# Patient Record
Sex: FEMALE | Race: WHITE | URBAN - METROPOLITAN AREA
[De-identification: names, ages, dates, MRNs, and addresses within clinical notes are randomized per-mention and may not be internally consistent; named-entity substitution may affect disease eponyms.]

---

## 2017-05-15 ENCOUNTER — HOSPITAL ENCOUNTER (OUTPATIENT)
Dept: OTHER | Age: 57
Discharge: HOME | End: 2017-05-15
Attending: FAMILY MEDICINE

## 2017-05-17 LAB
CULTURE: NORMAL
REPORT STATUS: NORMAL
REQUEST PROBLEM: NORMAL
SPECIMEN: NORMAL

## 2019-03-24 ENCOUNTER — HOSPITAL ENCOUNTER (OUTPATIENT)
Age: 59
Setting detail: SPECIMEN
Discharge: HOME | End: 2019-03-24
Payer: COMMERCIAL

## 2019-03-24 PROCEDURE — 87804 INFLUENZA ASSAY W/OPTIC: CPT

## 2019-03-26 LAB
RAPID INFLUENZA  B AGN: NEGATIVE
RAPID INFLUENZA A AGN: NEGATIVE

## 2020-10-13 ENCOUNTER — HOSPITAL ENCOUNTER (EMERGENCY)
Age: 60
Discharge: ANOTHER HOSPITAL | End: 2020-10-13
Attending: EMERGENCY MEDICINE
Payer: MEDICARE

## 2020-10-13 ENCOUNTER — APPOINTMENT (OUTPATIENT)
Dept: MRI IMAGING | Age: 60
End: 2020-10-13
Payer: MEDICARE

## 2020-10-13 VITALS
WEIGHT: 162 LBS | SYSTOLIC BLOOD PRESSURE: 148 MMHG | HEIGHT: 62 IN | HEART RATE: 75 BPM | OXYGEN SATURATION: 100 % | TEMPERATURE: 98.5 F | RESPIRATION RATE: 20 BRPM | DIASTOLIC BLOOD PRESSURE: 78 MMHG | BODY MASS INDEX: 29.81 KG/M2

## 2020-10-13 LAB
ALBUMIN SERPL-MCNC: 3.6 GM/DL (ref 3.4–5)
ALP BLD-CCNC: 99 IU/L (ref 40–129)
ALT SERPL-CCNC: 15 U/L (ref 10–40)
ANION GAP SERPL CALCULATED.3IONS-SCNC: 12 MMOL/L (ref 4–16)
AST SERPL-CCNC: 16 IU/L (ref 15–37)
BACTERIA: NEGATIVE /HPF
BASOPHILS ABSOLUTE: 0.1 K/CU MM
BASOPHILS RELATIVE PERCENT: 0.5 % (ref 0–1)
BILIRUB SERPL-MCNC: 0.2 MG/DL (ref 0–1)
BILIRUBIN URINE: NEGATIVE MG/DL
BLOOD, URINE: NEGATIVE
BUN BLDV-MCNC: 23 MG/DL (ref 6–23)
CALCIUM SERPL-MCNC: 9 MG/DL (ref 8.3–10.6)
CHLORIDE BLD-SCNC: 99 MMOL/L (ref 99–110)
CLARITY: CLEAR
CO2: 24 MMOL/L (ref 21–32)
COLOR: COLORLESS
CREAT SERPL-MCNC: 0.8 MG/DL (ref 0.6–1.1)
DIFFERENTIAL TYPE: ABNORMAL
EOSINOPHILS ABSOLUTE: 0.2 K/CU MM
EOSINOPHILS RELATIVE PERCENT: 1.9 % (ref 0–3)
ERYTHROCYTE SEDIMENTATION RATE: 36 MM/HR (ref 0–30)
GFR AFRICAN AMERICAN: >60 ML/MIN/1.73M2
GFR NON-AFRICAN AMERICAN: >60 ML/MIN/1.73M2
GLUCOSE BLD-MCNC: 172 MG/DL (ref 70–99)
GLUCOSE, URINE: NEGATIVE MG/DL
HCT VFR BLD CALC: 36.1 % (ref 37–47)
HEMOGLOBIN: 11.5 GM/DL (ref 12.5–16)
HIGH SENSITIVE C-REACTIVE PROTEIN: 13.9 MG/L
IMMATURE NEUTROPHIL %: 1.8 % (ref 0–0.43)
KETONES, URINE: NEGATIVE MG/DL
LEUKOCYTE ESTERASE, URINE: NEGATIVE
LYMPHOCYTES ABSOLUTE: 2 K/CU MM
LYMPHOCYTES RELATIVE PERCENT: 15.7 % (ref 24–44)
MCH RBC QN AUTO: 29.7 PG (ref 27–31)
MCHC RBC AUTO-ENTMCNC: 31.9 % (ref 32–36)
MCV RBC AUTO: 93.3 FL (ref 78–100)
MONOCYTES ABSOLUTE: 0.9 K/CU MM
MONOCYTES RELATIVE PERCENT: 6.9 % (ref 0–4)
MUCUS: ABNORMAL HPF
NITRITE URINE, QUANTITATIVE: NEGATIVE
NUCLEATED RBC %: 0 %
PDW BLD-RTO: 12.7 % (ref 11.7–14.9)
PH, URINE: 7 (ref 5–8)
PLATELET # BLD: 333 K/CU MM (ref 140–440)
PMV BLD AUTO: 9.7 FL (ref 7.5–11.1)
POTASSIUM SERPL-SCNC: 4.1 MMOL/L (ref 3.5–5.1)
PROTEIN UA: NEGATIVE MG/DL
RBC # BLD: 3.87 M/CU MM (ref 4.2–5.4)
RBC URINE: <1 /HPF (ref 0–6)
SEGMENTED NEUTROPHILS ABSOLUTE COUNT: 9.2 K/CU MM
SEGMENTED NEUTROPHILS RELATIVE PERCENT: 73.2 % (ref 36–66)
SODIUM BLD-SCNC: 135 MMOL/L (ref 135–145)
SPECIFIC GRAVITY UA: 1.01 (ref 1–1.03)
SQUAMOUS EPITHELIAL: <1 /HPF
TOTAL IMMATURE NEUTOROPHIL: 0.22 K/CU MM
TOTAL NUCLEATED RBC: 0 K/CU MM
TOTAL PROTEIN: 6.2 GM/DL (ref 6.4–8.2)
TRICHOMONAS: ABNORMAL /HPF
UROBILINOGEN, URINE: NORMAL MG/DL (ref 0.2–1)
WBC # BLD: 12.5 K/CU MM (ref 4–10.5)
WBC UA: 1 /HPF (ref 0–5)

## 2020-10-13 PROCEDURE — 6370000000 HC RX 637 (ALT 250 FOR IP): Performed by: PHYSICIAN ASSISTANT

## 2020-10-13 PROCEDURE — 96376 TX/PRO/DX INJ SAME DRUG ADON: CPT

## 2020-10-13 PROCEDURE — 72158 MRI LUMBAR SPINE W/O & W/DYE: CPT

## 2020-10-13 PROCEDURE — 96375 TX/PRO/DX INJ NEW DRUG ADDON: CPT

## 2020-10-13 PROCEDURE — A9579 GAD-BASE MR CONTRAST NOS,1ML: HCPCS | Performed by: EMERGENCY MEDICINE

## 2020-10-13 PROCEDURE — 85652 RBC SED RATE AUTOMATED: CPT

## 2020-10-13 PROCEDURE — 85025 COMPLETE CBC W/AUTO DIFF WBC: CPT

## 2020-10-13 PROCEDURE — 86141 C-REACTIVE PROTEIN HS: CPT

## 2020-10-13 PROCEDURE — 96374 THER/PROPH/DIAG INJ IV PUSH: CPT

## 2020-10-13 PROCEDURE — 36415 COLL VENOUS BLD VENIPUNCTURE: CPT

## 2020-10-13 PROCEDURE — 80053 COMPREHEN METABOLIC PANEL: CPT

## 2020-10-13 PROCEDURE — 99285 EMERGENCY DEPT VISIT HI MDM: CPT

## 2020-10-13 PROCEDURE — 81001 URINALYSIS AUTO W/SCOPE: CPT

## 2020-10-13 PROCEDURE — 6360000002 HC RX W HCPCS: Performed by: PHYSICIAN ASSISTANT

## 2020-10-13 PROCEDURE — 6360000002 HC RX W HCPCS: Performed by: EMERGENCY MEDICINE

## 2020-10-13 PROCEDURE — 6360000004 HC RX CONTRAST MEDICATION: Performed by: EMERGENCY MEDICINE

## 2020-10-13 RX ORDER — OXYCODONE HYDROCHLORIDE 5 MG/1
5 TABLET ORAL ONCE
Status: COMPLETED | OUTPATIENT
Start: 2020-10-13 | End: 2020-10-13

## 2020-10-13 RX ORDER — HYDROMORPHONE HCL 110MG/55ML
1 PATIENT CONTROLLED ANALGESIA SYRINGE INTRAVENOUS ONCE
Status: COMPLETED | OUTPATIENT
Start: 2020-10-13 | End: 2020-10-13

## 2020-10-13 RX ORDER — FENTANYL CITRATE 50 UG/ML
100 INJECTION, SOLUTION INTRAMUSCULAR; INTRAVENOUS ONCE
Status: COMPLETED | OUTPATIENT
Start: 2020-10-13 | End: 2020-10-13

## 2020-10-13 RX ORDER — HYDROMORPHONE HCL 110MG/55ML
1 PATIENT CONTROLLED ANALGESIA SYRINGE INTRAVENOUS EVERY 4 HOURS PRN
Status: DISCONTINUED | OUTPATIENT
Start: 2020-10-13 | End: 2020-10-14 | Stop reason: HOSPADM

## 2020-10-13 RX ADMIN — FENTANYL CITRATE 100 MCG: 50 INJECTION, SOLUTION INTRAMUSCULAR; INTRAVENOUS at 16:59

## 2020-10-13 RX ADMIN — HYDROMORPHONE HYDROCHLORIDE 1 MG: 2 INJECTION, SOLUTION INTRAMUSCULAR; INTRAVENOUS; SUBCUTANEOUS at 13:26

## 2020-10-13 RX ADMIN — GADOTERIDOL 15 ML: 279.3 INJECTION, SOLUTION INTRAVENOUS at 17:06

## 2020-10-13 RX ADMIN — OXYCODONE HYDROCHLORIDE 5 MG: 5 TABLET ORAL at 12:05

## 2020-10-13 RX ADMIN — FENTANYL CITRATE 100 MCG: 50 INJECTION, SOLUTION INTRAMUSCULAR; INTRAVENOUS at 14:29

## 2020-10-13 RX ADMIN — HYDROMORPHONE HYDROCHLORIDE 1 MG: 2 INJECTION, SOLUTION INTRAMUSCULAR; INTRAVENOUS; SUBCUTANEOUS at 20:03

## 2020-10-13 ASSESSMENT — PAIN SCALES - WONG BAKER: WONGBAKER_NUMERICALRESPONSE: 4

## 2020-10-13 ASSESSMENT — PAIN DESCRIPTION - LOCATION: LOCATION: BACK;LEG

## 2020-10-13 ASSESSMENT — PAIN SCALES - GENERAL
PAINLEVEL_OUTOF10: 10
PAINLEVEL_OUTOF10: 8
PAINLEVEL_OUTOF10: 10
PAINLEVEL_OUTOF10: 4
PAINLEVEL_OUTOF10: 10

## 2020-10-13 ASSESSMENT — PAIN DESCRIPTION - PAIN TYPE
TYPE: ACUTE PAIN
TYPE: ACUTE PAIN;CHRONIC PAIN

## 2020-10-13 ASSESSMENT — PAIN DESCRIPTION - DESCRIPTORS: DESCRIPTORS: ACHING;STABBING;SHARP

## 2020-10-13 ASSESSMENT — PAIN DESCRIPTION - FREQUENCY: FREQUENCY: CONTINUOUS

## 2020-10-13 ASSESSMENT — PAIN DESCRIPTION - ONSET: ONSET: PROGRESSIVE

## 2020-10-13 NOTE — ED PROVIDER NOTES
EMERGENCY DEPARTMENT ENCOUNTER      PCP: Ernesto Vernon DO    CHIEF COMPLAINT    Chief Complaint   Patient presents with    Back Pain     back surgery 11 days ago    Leg Pain       HPI    Sridhar Mccullough is a 61 y.o. female who presents with continued low back and bilateral leg pain. Patient is status post L4-5 hemilaminectomy, discectomy and foraminotomy for decompression of L4 nerve in May of this past year. This was comp gated by CSF leak status post repair on 5/19/2020. Patient underwent an L4-5 fusion on 10-2-2020. She states she initially was doing okay, however over at least the past week she has had intractable back pain and bilateral lower extremity pain, right greater than left. She complains of a burning, nerve pain in her extremities. She states that she was admitted at [facility], discharged yesterday evening. She states she was having difficulty getting around her home this morning and worsening pain so called EMS for further evaluation here. She states she did have an MRI performed a couple days ago. She denies any recent fevers, nausea, vomiting. No urinary retention, dysuria, hematuria. States that she did have some issues with constipation, however has been having normal bowel movements over the past several days. No bowel or bladder incontinence. She denies numbness or tingling of bilateral lower extremities. No new weakness. No abdominal pain. REVIEW OF SYSTEMS    Constitutional:  Denies fever, chills, weight loss, or weakness. Cardiovascular:  Denies chest pain, palpitations, or swelling  Respiratory:  Denies cough or shortness of breath    GI:  Denies abdominal pain, nausea, vomiting, or diarrhea  :  Denies any urinary symptoms or vaginal symptoms. Musculoskeletal:  See HPI above   Skin:  Denies rash  Neurologic:   See HPI No bowel incontinence or bladder retention, no saddle anesthesia. +radicular symptoms.  No lower extremity weakness or altered sensation. Endocrine:  Denies polyuria or polydypsia   Lymphatic:  Denies swollen glands     All other review of systems are negative  See HPI and nursing notes for additional information       PAST MEDICAL & SURGICAL HISTORY    Past Medical History:   Diagnosis Date    Anxiety     Arthritis     \"In my right hip\"    Depression     Diabetes mellitus ([address] Dx 2000's    Diverticulitis     Hyperlipidemia     Lower back pain     Panic attacks     Shingles 12/11    \"Left Side\"    Shortness of breath on exertion      Past Surgical History:   Procedure Laterality Date    BACK SURGERY      CHOLECYSTECTOMY      HYSTERECTOMY  1990    \"Partial Abdominal Hysterectomy, Bladder Tied Up\"    LAPAROSCOPY  1984    D & C Also Done    TONSILLECTOMY  1960's       CURRENT MEDICATIONS    Current Outpatient Rx   Medication Sig Dispense Refill    methocarbamol (ROBAXIN-750) 750 MG tablet Take 1 tablet by mouth 4 times daily 20 tablet 0    ondansetron (ZOFRAN ODT) 4 MG disintegrating tablet Take 2 tablets by mouth every 8 hours as needed for Nausea or Vomiting. 5 tablet 2    mupirocin (BACTROBAN) 2 % ointment Apply  topically 3 times daily. Apply topically 3 times daily.  amiodarone (CORDARONE) 200 MG tablet Take 1 tablet by mouth daily for 30 days. 30 tablet 3    aspirin 81 MG chewable tablet Take 4 tablets by mouth daily for 30 days. 30 tablet 3    metoprolol (LOPRESSOR) 25 MG tablet Take 1 tablet by mouth 2 times daily for 30 days. 60 tablet 3    simvastatin (ZOCOR) 20 MG tablet Take 1 tablet by mouth nightly for 30 days. 30 tablet 3    lisinopril (PRINIVIL;ZESTRIL) 10 MG tablet Take 0.5 tablets by mouth daily. At Night 30 tablet 3    metformin (GLUCOPHAGE) 1000 MG tablet Take 1,000 mg by mouth nightly.  fluoxetine (PROZAC) 40 MG capsule Take 40 mg by mouth nightly.  glipiZIDE (GLUCOTROL) 10 MG tablet Take 10 mg by mouth 2 times daily.       sitagliptan (JANUVIA) 100 MG tablet Take 100 mg by mouth daily. At Night         ALLERGIES    No Known Allergies    SOCIAL HISTORY    Social History     Socioeconomic History    Marital status:      Spouse name: None    Number of children: None    Years of education: None    Highest education level: None   Occupational History    None   Social Needs    Financial resource strain: None    Food insecurity     Worry: None     Inability: None    Transportation needs     Medical: None     Non-medical: None   Tobacco Use    Smoking status: Former Smoker     Packs/day: 0.50     Years: 13.00     Pack years: 6.50     Last attempt to quit: 10/13/2017     Years since quitting: 3.0    Smokeless tobacco: Never Used   Substance and Sexual Activity    Alcohol use: No    Drug use: No    Sexual activity: Yes     Partners: Male   Lifestyle    Physical activity     Days per week: None     Minutes per session: None    Stress: None   Relationships    Social connections     Talks on phone: None     Gets together: None     Attends Mandaeism service: None     Active member of club or organization: None     Attends meetings of clubs or organizations: None     Relationship status: None    Intimate partner violence     Fear of current or ex partner: None     Emotionally abused: None     Physically abused: None     Forced sexual activity: None   Other Topics Concern    None   Social History Narrative    None       PHYSICAL EXAM    VITAL SIGNS: BP (!) 179/85   Pulse 66   Temp 98.5 °F (36.9 °C) (Oral)   Resp 20   Ht 5' 2\" (1.575 m)   Wt 162 lb (73.5 kg)   LMP 09/26/1990   SpO2 96%   BMI 29.63 kg/m²    Patient was noted to be afebrile    Constitutional:  Well developed, well nourished. HENT:  Atraumatic, moist mucus membranes. Eyes:  No orbital trauma. No scleral icterus. Neck: No JVD, supple. No enlarged lymph nodes. Cardiovascular:  Normal rate, regular rhythm, no murmurs/rubs/gallops  Respiratory:  No respiratory distress, normal breath sounds.    Abdomen: Bowel sounds normal, abdomen soft, no tenderness, no masses. Musculoskeletal:  No edema, no deformities. Back:   - No gross deformity, swelling, or discoloration.    -No significant tenderness palpation across lumbar or paralumbar spine  - No localized midline bony tenderness.   - No change in pain with forward flexion.  - SLR test positive bilaterally    No CVA tenderness to percussion    Integument:  Well hydrated, no rash, no pallor. Neurologic:    Intact sensation lower leg, including nl sensation to light touch inner thigh, distal legs, foot,  Sensation inner upper thigh normal  5/5 strength adduction thigh, flex/extension knee, foot dorsiflexion/extension, toe extension/curling toes. Unable to evaluate deep tendon reflexes due to pain.     Vascular:  Distal pulses and capillary refill intact in bilateral lower extremities      RADIOLOGY/PROCEDURES    MRI LUMBAR SPINE W WO CONTRAST    (Results Pending)       Results for orders placed or performed during the hospital encounter of 10/13/20   CBC auto diff   Result Value Ref Range    WBC 12.5 (H) 4.0 - 10.5 K/CU MM    RBC 3.87 (L) 4.2 - 5.4 M/CU MM    Hemoglobin 11.5 (L) 12.5 - 16.0 GM/DL    Hematocrit 36.1 (L) 37 - 47 %    MCV 93.3 78 - 100 FL    MCH 29.7 27 - 31 PG    MCHC 31.9 (L) 32.0 - 36.0 %    RDW 12.7 11.7 - 14.9 %    Platelets 297 252 - 825 K/CU MM    MPV 9.7 7.5 - 11.1 FL    Differential Type AUTOMATED DIFFERENTIAL     Segs Relative 73.2 (H) 36 - 66 %    Lymphocytes % 15.7 (L) 24 - 44 %    Monocytes % 6.9 (H) 0 - 4 %    Eosinophils % 1.9 0 - 3 %    Basophils % 0.5 0 - 1 %    Segs Absolute 9.2 K/CU MM    Lymphocytes Absolute 2.0 K/CU MM    Monocytes Absolute 0.9 K/CU MM    Eosinophils Absolute 0.2 K/CU MM    Basophils Absolute 0.1 K/CU MM    Nucleated RBC % 0.0 %    Total Nucleated RBC 0.0 K/CU MM    Total Immature Neutrophil 0.22 K/CU MM    Immature Neutrophil % 1.8 (H) 0 - 0.43 %   CMP   Result Value Ref Range    Sodium 135 135 - 145 MMOL/L Potassium 4.1 3.5 - 5.1 MMOL/L    Chloride 99 99 - 110 mMol/L    CO2 24 21 - 32 MMOL/L    BUN 23 6 - 23 MG/DL    CREATININE 0.8 0.6 - 1.1 MG/DL    Glucose 172 (H) 70 - 99 MG/DL    Calcium 9.0 8.3 - 10.6 MG/DL    Alb 3.6 3.4 - 5.0 GM/DL    Total Protein 6.2 (L) 6.4 - 8.2 GM/DL    Total Bilirubin 0.2 0.0 - 1.0 MG/DL    ALT 15 10 - 40 U/L    AST 16 15 - 37 IU/L    Alkaline Phosphatase 99 40 - 129 IU/L    GFR Non-African American >60 >60 mL/min/1.73m2    GFR African American >60 >60 mL/min/1.73m2    Anion Gap 12 4 - 16   Urinalysis   Result Value Ref Range    Color, UA COLORLESS (A) YELLOW    Clarity, UA CLEAR CLEAR    Glucose, Urine NEGATIVE NEGATIVE MG/DL    Bilirubin Urine NEGATIVE NEGATIVE MG/DL    Ketones, Urine NEGATIVE NEGATIVE MG/DL    Specific Gravity, UA 1.008 1.001 - 1.035    Blood, Urine NEGATIVE NEGATIVE    pH, Urine 7.0 5.0 - 8.0    Protein, UA NEGATIVE NEGATIVE MG/DL    Urobilinogen, Urine NORMAL 0.2 - 1.0 MG/DL    Nitrite Urine, Quantitative NEGATIVE NEGATIVE    Leukocyte Esterase, Urine NEGATIVE NEGATIVE    RBC, UA <1 0 - 6 /HPF    WBC, UA 1 0 - 5 /HPF    Bacteria, UA NEGATIVE NEGATIVE /HPF    Squam Epithel, UA <1 /HPF    Mucus, UA RARE (A) NEGATIVE HPF    Trichomonas, UA NONE SEEN NONE SEEN /HPF   Sedimentation Rate   Result Value Ref Range    Sed Rate 36 (H) 0 - 30 MM/HR   C-Reactive Protein   Result Value Ref Range    CRP, High Sensitivity 13.9 mg/L         SIGNIFICANT LABS/IMAGES  Last BMP Result:  Recent Labs   Lab 10/11/20  0424   *   K 4.9      CO2 22   BUN 30*   CREATININE 1.0   *   CALCIUM 8.1*     Last CBC Result:  Recent Labs   Lab 10/10/20  0515   WBC 8.3   HEMOGLOBIN 11.5*   HCT 33.9*      RBC 3.80*     Mri-lumbar Spine W/wo Contrast Result Date: 10/9/2020 IMPRESSION: The most significant interval change since the previous MRI examination is anterior and interbody fusions at the L4/L5 level. Decreased disc height.  Fluid seen at the L4/L5 disc with dorsal osteophytic spurring. Difficult to exclude osteomyelitis/discitis complex. Moderate bilateral neuroforamina narrowing seen, this is slightly worse on the left side but improved on the right side. No acute lumbar spine fracture seen. Otherwise stable degenerative changes and postsurgical changes. Electronically Signed by: Margo Cheng MD, 10/9/2020 8:27 PM         ED COURSE & MEDICAL DECISION MAKING       Vital signs and nursing notes reviewed during ED course. Patient care and presentation staffed with supervising MD.   Patient seen by supervising MD today- see his/her note for details of the encounter. Patient presents as above. She is hypertensive on arrival, afebrile. Intact lower extremity neurological exam, no saddle anesthesias, bowel/bladder incontinence or urinary retention. She is unable to sit on the side of the bed to perform deep tendon reflexes. On chart review, patient did have an MRI performed during her recent admission. She did have elevated ESR at 67 and CRP at 70. MRI of lumbar spine showed fluid at the L4-5 disc with dorsal osteophyte spurring, difficult to exclude osteomyelitis/discitis as well as moderate bilateral neuroforaminal narrowing, worse on the left, but improved on the right. During her admission, no neurosurgical evaluations were recommended. Patient is treated with her home dose of Roxicodone here in the ED, will obtain labs, inflammatory markers and repeat MRI with possibility of osteomyelitis/discitis on previous MRI imaging. Lab work with mild leukocytosis of 12.5. CRP elevated at 13.9, however is improved when compared with previous value. Sed rate elevated at 36, again improved. Patient unable to tolerate MRI imaging, will plan on giving dose of Dilaudid prior to this imaging.     ________________________________________________________________________    2:24 PM EDT I have signed out [facility] care to Dr. Govind Vega.    We discussed the history, physical exam, completed/pending test results (if obtained) and current treatment plan. At time of patient signout MRI imaging pending.     ________________________________________________________________________          Clinical  IMPRESSION    1. Lumbar radiculopathy    2. Leukocytosis, unspecified type            Comment: Please note this report has been produced using speech recognition software and may contain errors related to that system including errors in grammar, punctuation, and spelling, as well as words and phrases that may be inappropriate. If there are any questions or concerns please feel free to contact the dictating provider for clarification.         FELY Robert  10/13/20 4824

## 2020-10-13 NOTE — ED NOTES
The patient sts that she had back surgery at [facility] the Friday before last on L4-L5. She began having pain to the lower back shooting down both legs 4 days ago. Seen at [facility] in the ED last Friday.       Jackson Lee RN  10/13/20 1385

## 2020-10-13 NOTE — CARE COORDINATION
CM received consult on patient. CM went to patients' room, introduced self and explained role of CM. Patient shared that she had back surgery 1 week ago and then stayed inpatient at [facility] for one week. Patient was just discharged home. Patient stated that she cannot walk and has terrible pain. Patient explained that the pain was running down patients' legs. CM asked patient if she was interested in setting up [facility] when she is discharged and patient reported that she was and had talked with ED physician about it. Patient stated that she would like to utilize [facility] out of [place]. CM spoke to Dr. Parth Adam and Dr. Parth Adam reported that patient may be getting transferred back to [facility] where her surgery team is. Patient is also on a very large amount of pain medication. It appears that patient was on pain medication before surgery and then patient had surgery, so pain medication is not working well for pain management at this time. Dr. Parth Adam also discussed setting up [facility] with patient for when patient goes home. CM called [facility] @ [phone] and had to leave a message on . Fax number for Miky is: [phone]. CM awaiting a return call from [address] [place] [address]. If CM does not get a response soon; CM will attempt to call again. 16:00 CM returned call to [facility] agency in [place] @ [phone]. CM left another  message on physicians line at this time requesting a return call. CM spoke to Dr. Parth Adam regarding patient and Dr. Parth Adam states that he is going to admit patient. CM stated that [address] [place], [place] [address]S [facility] has been contacted, but has not returned call yet. CM will leave a note to ensure that Case Management upstairs is aware to call agency in order to set up services for patient once patient is discharged.

## 2020-10-13 NOTE — ED NOTES
Patient is still at [address] [facility]  10/13/20 1189

## 2020-10-13 NOTE — ED NOTES
Bed: ED-34  Expected date:   Expected time:   Means of arrival:   Comments:  Jo Castillo, RN  10/13/20 1007

## 2020-10-13 NOTE — ED NOTES
Jeni Olszewski is the nale of her surgeon. She was given Fentanyl 100 mcg IM per the medics. Many bruises to her arm from IVs recently.       Agustin Jolly RN  10/13/20 1726

## 2020-10-13 NOTE — ED PROVIDER NOTES
I independently examined and evaluated Esha Green. In brief their history revealed patient is here with increasing low back pain. Patient is status post lumbar fusion approximately 10 days ago at [facility].  Patient was readmitted and discharged chest yesterday for further pain control. During that admission patient did undergo MRI imaging and there was a questionable developing osteomyelitis but patient did not go any further neurosurgical intervention after neurosurgery weighed in again. Patient reports pain is a change from baseline and is currently 10/10, constant and radiating into the right leg. Patient is taking \"perc-10s\" for the pain and had two prior to arrival.      Per chart from [place]: \"History of L4-5 hemilaminectomy, discectomy, and foraminotomy for decompression of the L4 nerve on 5/12/2020. This was complicated by CSF leak status post repair on 5/19/2020. She most recently underwent L4-5 fusion on 10/2/2020. \"      Their focused exam revealed the patient is hypertensive but otherwise afebrile and hemodynamically stable on room air. The patient appears deconditioned and older than stated age. Mucous membranes are moist. Speech is clear. Breathing is unlabored. Speaking clearly in full sentences. Skin is dry. Mental status is normal. The patient moves all extremities and is without facial droop. Strong and symmetric bilateral lower extremity pulses. Sensation intact globally light touch. No saddle anesthesia. 5 out of 5 and symmetric dorsi/plantarflexion. ED course: Pt presents as above. Emergent conditions considered. Presentation prompted initial labs which do demonstrate a small leukocytosis. Downtrending inflammatory markers. Patient requiring significant doses of IV narcotics on top of her home Percocet to control her pain even to a moderate level. MRI imaging is pursued given her recent surgical procedure.     MRI imaging demonstrates slightly increased fluid collection near surgical sites that could be postoperative versus infectious/inflammatory. I did speak with patient's neurosurgery team at [facility]. I spoke with Dr. Paulita Brunner and the MRI findings were discussed in comparison to the recent MRI performed at [facility] and given the current read he suspects these are likely postoperative changes and that there should be no acute neurosurgical intervention. He is happy to evaluate the patient at [facility] but also feels given the patient here would be reasonable for pain control and rehab. On discussion with the patient she adamantly prefers [facility] for further care and transfer order was placed. I then spoke with the hospitalist nurse practitioner, Trena Seay, who graciously accepted patient. Patient will be awaiting bed and definitive transfer to be arranged. Patient is on as needed Dilaudid every 3-4 hours in addition to her longer acting medication she took already. Questions sought and answered with the patient and son. They voice understanding and agree with plan. All diagnostic, treatment, and disposition decisions were made by myself in conjunction with the Advanced Practice Provider. For all further details of the patient's emergency department visit, please see the Advanced Practice Provider's documentation.        Florecita Mcintosh MD  10/13/20 8016

## 2020-10-13 NOTE — ED NOTES
Placed patient on bedpan. Patient swinging at this nurse and yelling at this nurse. Patient was very disrespectful to this nurse. Patient states she is mad for not getting more pain medication. Patient asked this nurse to leave the room so she could use the bedpan.         Nitish Garcia RN  10/13/20 6020

## 2020-10-14 NOTE — ED NOTES
Called report to Lyubov Ortiz at [facility] for room 5423.   [phone]     Lestine Cushing, RN  10/13/20 8586

## 2020-10-14 NOTE — ED NOTES
Changed patient's bed and cleaned patient up. Notified patient that she is unable to receive any more pain medication for a few hours.       Lestine Cushing, RN  10/13/20 2036

## 2020-10-14 NOTE — ED NOTES
Pt will be going to room 5423 on [facility] [place]. Report to be called at [phone].     Waiting on transport ETA from [facility]  10/13/20 1784

## 2020-10-14 NOTE — ED NOTES
Patient verbalized that she is doing alright and denies needing anything at this time. Call light within reach.       Cesar Watson RN  10/13/20 4022